# Patient Record
Sex: MALE | Race: BLACK OR AFRICAN AMERICAN
[De-identification: names, ages, dates, MRNs, and addresses within clinical notes are randomized per-mention and may not be internally consistent; named-entity substitution may affect disease eponyms.]

---

## 2021-02-27 ENCOUNTER — HOSPITAL ENCOUNTER (EMERGENCY)
Dept: HOSPITAL 72 - EMR | Age: 66
Discharge: HOME | End: 2021-02-27
Payer: MEDICAID

## 2021-02-27 VITALS — SYSTOLIC BLOOD PRESSURE: 163 MMHG | DIASTOLIC BLOOD PRESSURE: 80 MMHG

## 2021-02-27 VITALS — BODY MASS INDEX: 23.62 KG/M2 | WEIGHT: 165 LBS | HEIGHT: 70 IN
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
## 2021-02-27 DIAGNOSIS — E78.00: ICD-10-CM

## 2021-02-27 DIAGNOSIS — M54.5: Primary | ICD-10-CM

## 2021-02-27 DIAGNOSIS — I10: ICD-10-CM
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
## 2021-02-27 DIAGNOSIS — E11.9: ICD-10-CM

## 2021-02-27 PROCEDURE — 72131 CT LUMBAR SPINE W/O DYE: CPT

## 2021-02-27 PROCEDURE — 72070 X-RAY EXAM THORAC SPINE 2VWS: CPT

## 2021-02-27 PROCEDURE — 99284 EMERGENCY DEPT VISIT MOD MDM: CPT

## 2021-02-27 NOTE — EMERGENCY ROOM REPORT
History of Present Illness


General


Chief Complaint:  Lower Back Pain or Injury


Source:  Patient





Present Illness


HPI


Disclaimer: Please note that this report is being documented using [employer] 

technology. This can lead to erroneous entry secondary to incorrect 

interpretation by the dictating instrument.





HPI: 65-year-old male presents with back pain.  Patient states he was working at

a job site 3 weeks ago missed the last step of a ladder going down falling 

backwards and landing on his buttocks.  Complaining of lower back pain that does

not radiate.  Worse with bending and twisting motion.  No injury since the 

initial injury 3 weeks ago.  Denies numbness or tingling.  Denies lower 

extremity weakness.  Denies urinary retention or fecal incontinence.  Denies 

fever or chills.  Sometimes uses a cane to help him stay upright but does not 

needed to ambulate.  Has not taken any medication prior to arrival.


 


PMH: Reviewed


 


PSH: Reviewed


 


Allergies: Reviewed


 


Social Hx: Reviewed





COVID-19 Screening


Contact w/high risk pt:  No


Experienced COVID-19 symptoms?:  No


COVID-19 Testing performed PTA:  No





Nursing Documentation-PMH


Hx Hypertension:  Yes - high cholesterol


Hx Diabetes:  Yes





Review of Systems


All Other Systems:  negative except mentioned in HPI





Physical Exam





Vital Signs








  Date Time  Temp Pulse Resp B/P (MAP) Pulse Ox O2 Delivery O2 Flow Rate FiO2


 


2/27/21 13:21 98.1 70 18 163/80 (107) 98   





 


 





General: Awake and alert, no acute distress


HEENT: NC/AT. EOMI. 


Resp: Normal work of breathing


Skin: Intact.  No abrasions, laceration or rash over the exposed skin


MSK: Normal tone and bulk. Moving all extremities.  No obvious deformity.


Neuro: Awake and alert.  Mentating appropriately.  No saddle anesthesia.  No 

lower extremity weakness.  Ambulating with steady gait


Spine: No tenderness, step-off or deformity in the cervical, thoracic spine.  No

paraspinal tenderness.  Mild midline tenderness without palpable step-off or 

deformity in the upper lumbar spine.  No significant paraspinal tenderness.





Medical Decision Making


Diagnostic Impression:  


   Primary Impression:  


   Back pain


ER Course


65-year-old male presents for evaluation of back pain after a fall 3 weeks ago. 

Neurologically intact.  CT obtained to evaluate for fracture and none was 

identified.  Degenerative changes noted which are appear to be chronic.  The 

patient during discharge process was also complaining of pain in the mid back.  

No significant tenderness.  X-ray was obtained to evaluate but no fracture or 

subluxation identified.  He stable for outpatient follow-up.  Will prescribe 

lidocaine patches and Tylenol and Motrin.  Follow-up with PMD.  Instructed to 

return new or worsening symptoms.  He understands and agrees with the treatment 

plan.


Other X-Ray Diagnostic Results


Other X-Ray Diagnostic Results :  


   X-Ray ordered:  Thoracic spine


   # of Views/Limited Vs Complete:  2 View


   Indication:  Pain


   EP Interpretation:  Yes


   Interpretation:  no dislocation, no soft tissue swelling, no fractures


   Impression:  No acute disease


   Electronically Signed by:  Electronically signed by Dr. Nik Vu MD





CT/MRI/US Diagnostic Results


CT/MRI/US Diagnostic Results :  


   Impression


Procedure: CT L Spine no Contrast





CT L SPINE


 


HISTORY: Pain 


 


TECHNIQUE:  One or more of the following dose reduction techniques were 


used: automated exposure control, adjustment of the mA and/or kV 


according to patient size, use of iterative reconstruction technique.


 


One or more of the following dose reduction techniques were used: 


automated exposure control, adjustment of the mA and/or kV according to 


patient size, use of iterative reconstruction technique.


Total Exam volume computed tomography dose index (CTDIvol) =  11.3  mGy 


and Dose Length Product (DLP) = 408.5mGY-c


 


TECHNIQUE: Multiple, contiguous axial cuts of the lumbar spine are 


obtained.  Sagittal and coronal reformats are available.  No IV contrast 


given.


 


COMPARISON: None


 


FINDINGS:  No fracture or subluxation is identified.  Vertebral body 


heights appear maintained without compression deformity.  Facet 


hypertrophy demonstrated.  No acute fracture .  Concentric disc bulge 


causing canal stenosis at L3-4, L4-5 and L5-S1 with bilateral neural 


foraminal narrowing.  Sclerosis of the right greater than left sacroiliac 


joints.  Bone mineralization is within normal limits.  No prevertebral 


soft tissue thickening.  Mild curvature to the left with otherwise normal 


alignment.


 


IMPRESSION: Degenerative changes without fracture or subluxation.


 














Dictated By:    Wade Davila M.D.                                


Electronically Signed By:Wade Davila M.D.                                


Signed Date/Time02/27/21 1440                                   








CC: Skye Potter M.D.





Last Vital Signs








  Date Time  Temp Pulse Resp B/P (MAP) Pulse Ox O2 Delivery O2 Flow Rate FiO2


 


2/27/21 13:28 98.1  18 163/80 98   


 


2/27/21 13:21  70      








Disposition:  HOME, SELF-CARE


Condition:  Stable


Scripts


Acetaminophen* (TYLENOL EXTRA STRENGTH*) 500 Mg Tablet


500 MG ORAL Q8H PRN for Prn Headache/Temp > 101, #30 TAB 0 Refills


   Prov: Nik Vu MD         2/27/21 


Lidocaine Patch* (Lidoderm Patch*) 1 Each Adh..patch


1 PATCH TOPIC DAILY, #30 PATCH


   Patch(es) may remain in place for up to 12 hours in any 24-hour


   period.


   Prov: Nik Vu MD         2/27/21 


Ibuprofen* (MOTRIN*) 600 Mg Tablet


600 MG ORAL Q6H PRN for For Pain, #30 TAB 0 Refills


   Prov: Nik Vu MD         2/27/21











Nik Vu MD              Feb 27, 2021 14:21

## 2021-02-27 NOTE — DIAGNOSTIC IMAGING REPORT
CT L SPINE

 

HISTORY: Pain 

 

TECHNIQUE:  One or more of the following dose reduction techniques were 

used: automated exposure control, adjustment of the mA and/or kV 

according to patient size, use of iterative reconstruction technique.

 

One or more of the following dose reduction techniques were used: 

automated exposure control, adjustment of the mA and/or kV according to 

patient size, use of iterative reconstruction technique.

Total Exam volume computed tomography dose index (CTDIvol) =  11.3  mGy 

and Dose Length Product (DLP) = 408.5mGY-c

 

TECHNIQUE: Multiple, contiguous axial cuts of the lumbar spine are 

obtained.  Sagittal and coronal reformats are available.  No IV contrast 

given.

 

COMPARISON: None

 

FINDINGS:  No fracture or subluxation is identified.  Vertebral body 

heights appear maintained without compression deformity.  Facet 

hypertrophy demonstrated.  No acute fracture .  Concentric disc bulge 

causing canal stenosis at L3-4, L4-5 and L5-S1 with bilateral neural 

foraminal narrowing.  Sclerosis of the right greater than left sacroiliac 

joints.  Bone mineralization is within normal limits.  No prevertebral 

soft tissue thickening.  Mild curvature to the left with otherwise normal 

alignment.

 

IMPRESSION: Degenerative changes without fracture or subluxation.

## 2021-02-27 NOTE — DIAGNOSTIC IMAGING REPORT
FILM T SPINE, 2 views

 

INDICATION: Injury

 

COMPARISON: None

 

FINDINGS: Multiple views of the thoracic spine are obtained.

 

Vertebral body heights and disk spaces are intact with mild disc space 

narrowing and osteophytosis.  Alignment is anatomic.  The paraspinal soft 

tissues are within normal limits.

 

IMPRESSION:  No acute fracture or subluxation. [General Appearance - Alert] : alert [General Appearance - In No Acute Distress] : in no acute distress [Oriented To Time, Place, And Person] : oriented to person, place, and time [Impaired Insight] : insight and judgment were intact [Affect] : the affect was normal [Person] : oriented to person [Place] : oriented to place [Time] : oriented to time [Short Term Intact] : short term memory intact [Remote Intact] : remote memory intact [Fluency] : fluency intact [Comprehension] : comprehension intact [Current Events] : adequate knowledge of current events [Vocabulary] : adequate range of vocabulary [Cranial Nerves Optic (II)] : visual acuity intact bilaterally,  pupils equal round and reactive to light [Cranial Nerves Oculomotor (III)] : extraocular motion intact [Cranial Nerves Trigeminal (V)] : facial sensation intact symmetrically [Cranial Nerves Facial (VII)] : face symmetrical [Cranial Nerves Glossopharyngeal (IX)] : tongue and palate midline [Cranial Nerves Accessory (XI - Cranial And Spinal)] : head turning and shoulder shrug symmetric [Cranial Nerves Hypoglossal (XII)] : there was no tongue deviation with protrusion [Motor Tone] : muscle tone was normal in all four extremities [Motor Strength] : muscle strength was normal in all four extremities [No Muscle Atrophy] : normal bulk in all four extremities [4] : C6 extensor pollicis longus  4/5 [5] : S1 toe walking 5/5 [Sensation Tactile Decrease] : light touch was intact [Sensation Pain / Temperature Decrease] : pain and temperature was intact [Abnormal Walk] : normal gait [Balance] : balance was intact [2+] : Patella left 2+ [No Visual Abnormalities] : no visible abnormailities [Normal] : normal [Past-pointing] : there was no past-pointing [Tremor] : no tremor present

## 2022-03-15 ENCOUNTER — HOSPITAL ENCOUNTER (EMERGENCY)
Dept: HOSPITAL 87 - ER | Age: 67
Discharge: ANOTHER HOSPITAL | End: 2022-03-15
Payer: COMMERCIAL

## 2022-03-15 VITALS — WEIGHT: 171.96 LBS | BODY MASS INDEX: 24.62 KG/M2 | HEIGHT: 70 IN

## 2022-03-15 VITALS — DIASTOLIC BLOOD PRESSURE: 68 MMHG | SYSTOLIC BLOOD PRESSURE: 162 MMHG

## 2022-03-15 DIAGNOSIS — Z79.82: ICD-10-CM

## 2022-03-15 DIAGNOSIS — E78.00: ICD-10-CM

## 2022-03-15 DIAGNOSIS — I10: ICD-10-CM

## 2022-03-15 DIAGNOSIS — Z20.822: ICD-10-CM

## 2022-03-15 DIAGNOSIS — E11.9: ICD-10-CM

## 2022-03-15 DIAGNOSIS — R07.89: Primary | ICD-10-CM

## 2022-03-15 LAB
APPEARANCE UR: CLEAR
BASOPHILS NFR BLD AUTO: 0.7 % (ref 0–2)
CHLORIDE SERPL-SCNC: 100 MEQ/L (ref 98–107)
COLOR UR: YELLOW
EOSINOPHIL NFR BLD AUTO: 1.5 % (ref 0–5)
ERYTHROCYTE [DISTWIDTH] IN BLOOD BY AUTOMATED COUNT: 16.2 % (ref 11.6–14.6)
HCT VFR BLD AUTO: 25.8 % (ref 42–52)
HGB BLD-MCNC: 8.4 G/DL (ref 14–18)
HGB UR QL STRIP: (no result)
KETONES UR STRIP-MCNC: (no result) MG/DL
LEUKOCYTE ESTERASE UR QL STRIP: NEGATIVE
LYMPHOCYTES NFR BLD AUTO: 11.5 % (ref 20–50)
MCH RBC QN AUTO: 26.8 PG (ref 28–32)
MCV RBC AUTO: 82.5 FL (ref 80–94)
MONOCYTES NFR BLD AUTO: 9 % (ref 2–8)
NEUTROPHILS NFR BLD AUTO: 77.3 % (ref 40–76)
NITRITE UR QL STRIP: NEGATIVE
PH UR STRIP: 8 [PH] (ref 4.5–8)
PLATELET # BLD AUTO: 507 X1000/UL (ref 130–400)
PMV BLD AUTO: 7.5 FL (ref 7.4–10.4)
PROT UR QL STRIP: (no result)
RBC # BLD AUTO: 3.13 MILL/UL (ref 4.7–6.1)
SP GR UR STRIP: 1.01 (ref 1–1.03)
UROBILINOGEN UR STRIP-MCNC: 0.2 E.U./DL (ref 0.2–1)

## 2022-03-15 PROCEDURE — 81003 URINALYSIS AUTO W/O SCOPE: CPT

## 2022-03-15 PROCEDURE — 93005 ELECTROCARDIOGRAM TRACING: CPT

## 2022-03-15 PROCEDURE — 83880 ASSAY OF NATRIURETIC PEPTIDE: CPT

## 2022-03-15 PROCEDURE — 96375 TX/PRO/DX INJ NEW DRUG ADDON: CPT

## 2022-03-15 PROCEDURE — 96374 THER/PROPH/DIAG INJ IV PUSH: CPT

## 2022-03-15 PROCEDURE — 96376 TX/PRO/DX INJ SAME DRUG ADON: CPT

## 2022-03-15 PROCEDURE — 87426 SARSCOV CORONAVIRUS AG IA: CPT

## 2022-03-15 PROCEDURE — 86900 BLOOD TYPING SEROLOGIC ABO: CPT

## 2022-03-15 PROCEDURE — 71045 X-RAY EXAM CHEST 1 VIEW: CPT

## 2022-03-15 PROCEDURE — 86901 BLOOD TYPING SEROLOGIC RH(D): CPT

## 2022-03-15 PROCEDURE — 86850 RBC ANTIBODY SCREEN: CPT

## 2022-03-15 PROCEDURE — 80053 COMPREHEN METABOLIC PANEL: CPT

## 2022-03-15 PROCEDURE — 36415 COLL VENOUS BLD VENIPUNCTURE: CPT

## 2022-03-15 PROCEDURE — 96361 HYDRATE IV INFUSION ADD-ON: CPT

## 2022-03-15 PROCEDURE — 85025 COMPLETE CBC W/AUTO DIFF WBC: CPT

## 2022-03-15 PROCEDURE — 84484 ASSAY OF TROPONIN QUANT: CPT

## 2022-03-15 PROCEDURE — 99285 EMERGENCY DEPT VISIT HI MDM: CPT

## 2023-01-19 ENCOUNTER — HOSPITAL ENCOUNTER (EMERGENCY)
Dept: HOSPITAL 87 - ER | Age: 68
Discharge: HOME | End: 2023-01-19
Payer: MEDICARE

## 2023-01-19 VITALS — SYSTOLIC BLOOD PRESSURE: 141 MMHG | DIASTOLIC BLOOD PRESSURE: 68 MMHG

## 2023-01-19 VITALS — HEIGHT: 67 IN | WEIGHT: 174.17 LBS | BODY MASS INDEX: 27.34 KG/M2

## 2023-01-19 DIAGNOSIS — E11.9: ICD-10-CM

## 2023-01-19 DIAGNOSIS — S70.02XA: ICD-10-CM

## 2023-01-19 DIAGNOSIS — I50.9: ICD-10-CM

## 2023-01-19 DIAGNOSIS — Z79.82: ICD-10-CM

## 2023-01-19 DIAGNOSIS — S30.0XXA: Primary | ICD-10-CM
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
## 2023-01-19 DIAGNOSIS — I11.0: ICD-10-CM

## 2023-01-19 DIAGNOSIS — S70.01XA: ICD-10-CM

## 2023-01-19 PROCEDURE — 74176 CT ABD & PELVIS W/O CONTRAST: CPT

## 2023-01-19 PROCEDURE — 99284 EMERGENCY DEPT VISIT MOD MDM: CPT

## 2023-01-19 PROCEDURE — 96372 THER/PROPH/DIAG INJ SC/IM: CPT

## 2025-07-13 ENCOUNTER — HOSPITAL ENCOUNTER (EMERGENCY)
Dept: HOSPITAL 87 - ER | Age: 70
Discharge: HOME | End: 2025-07-13
Payer: COMMERCIAL

## 2025-07-13 VITALS — BODY MASS INDEX: 28.17 KG/M2 | HEIGHT: 68 IN | WEIGHT: 185.85 LBS

## 2025-07-13 VITALS
HEART RATE: 74 BPM | SYSTOLIC BLOOD PRESSURE: 135 MMHG | DIASTOLIC BLOOD PRESSURE: 63 MMHG | OXYGEN SATURATION: 99 % | RESPIRATION RATE: 16 BRPM

## 2025-07-13 VITALS — OXYGEN SATURATION: 97 % | TEMPERATURE: 98 F

## 2025-07-13 DIAGNOSIS — Z79.84: ICD-10-CM

## 2025-07-13 DIAGNOSIS — Z79.899: ICD-10-CM

## 2025-07-13 DIAGNOSIS — I10: ICD-10-CM
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
## 2025-07-13 DIAGNOSIS — Z79.82: ICD-10-CM

## 2025-07-13 DIAGNOSIS — E78.00: ICD-10-CM

## 2025-07-13 DIAGNOSIS — E11.9: ICD-10-CM

## 2025-07-13 DIAGNOSIS — F10.90: ICD-10-CM

## 2025-07-13 DIAGNOSIS — H60.91: Primary | ICD-10-CM

## 2025-07-13 PROCEDURE — 99283 EMERGENCY DEPT VISIT LOW MDM: CPT
